# Patient Record
Sex: FEMALE | Race: BLACK OR AFRICAN AMERICAN
[De-identification: names, ages, dates, MRNs, and addresses within clinical notes are randomized per-mention and may not be internally consistent; named-entity substitution may affect disease eponyms.]

---

## 2022-08-26 PROBLEM — Z00.00 ENCOUNTER FOR PREVENTIVE HEALTH EXAMINATION: Status: ACTIVE | Noted: 2022-08-26

## 2022-09-02 ENCOUNTER — APPOINTMENT (OUTPATIENT)
Dept: VASCULAR SURGERY | Facility: CLINIC | Age: 62
End: 2022-09-02

## 2022-09-02 VITALS
DIASTOLIC BLOOD PRESSURE: 75 MMHG | BODY MASS INDEX: 32.2 KG/M2 | SYSTOLIC BLOOD PRESSURE: 146 MMHG | HEIGHT: 60 IN | WEIGHT: 164 LBS | HEART RATE: 56 BPM

## 2022-09-02 PROCEDURE — 93970 EXTREMITY STUDY: CPT

## 2022-09-02 PROCEDURE — 99204 OFFICE O/P NEW MOD 45 MIN: CPT

## 2022-09-02 NOTE — HISTORY OF PRESENT ILLNESS
[FreeTextEntry1] : 62-year-old female with a past medical history of hypertension presents for evaluation of bilateral lower extremity edema.  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  Patient states that when she was working she did wear compression stockings, patient is currently retired at this time and wears compression stockings intermittently.  Patient denies any history of DVT ulcers or wounds.  Patient denies any history of claudications

## 2022-09-02 NOTE — CONSULT LETTER
[Dear  ___] : Dear  [unfilled], [Consult Letter:] : I had the pleasure of evaluating your patient, [unfilled]. [Please see my note below.] : Please see my note below. [Consult Closing:] : Thank you very much for allowing me to participate in the care of this patient.  If you have any questions, please do not hesitate to contact me. [Sincerely,] : Sincerely, [FreeTextEntry3] : Chacho Boudreaux M.D., F.MECCAS., R.P.[email or web address].\par  of Vascular Surgery\par Assistant Professor of Radiology\par Director of Endovascular Program/ [facility]\par [facility]

## 2022-09-02 NOTE — PHYSICAL EXAM
[2+] : left 2+ [Varicose Veins Of Lower Extremities] : bilaterally [Ankle Swelling On The Right] : mild [No Rash or Lesion] : No rash or lesion [Alert] : alert [Calm] : calm [JVD] : no jugular venous distention  [Ankle Swelling (On Exam)] : not present [] : not present [Skin Ulcer] : no ulcer [de-identified] : Appears well [de-identified] : No palpable cords

## 2022-09-02 NOTE — ASSESSMENT
[FreeTextEntry1] : 62-year-old female with a past medical history of hypertension presents for evaluation of bilateral lower extremity edema.\par \par Venous duplex shows no evidence of DVT or insufficiency bilaterally\par \par Suspect pain is likely referred from the lower back patient advised to follow-up with neurology/Ortho. \par  Patient to follow-up as needed no vascular surgical intervention at this time.

## 2025-05-02 ENCOUNTER — TRANSCRIPTION ENCOUNTER (OUTPATIENT)
Age: 65
End: 2025-05-02

## 2025-05-21 ENCOUNTER — TRANSCRIPTION ENCOUNTER (OUTPATIENT)
Age: 65
End: 2025-05-21

## 2025-05-23 ENCOUNTER — TRANSCRIPTION ENCOUNTER (OUTPATIENT)
Age: 65
End: 2025-05-23

## 2025-05-26 ENCOUNTER — APPOINTMENT (OUTPATIENT)
Dept: AFTER HOURS CARE | Facility: EMERGENCY ROOM | Age: 65
End: 2025-05-26
Payer: MEDICARE

## 2025-05-26 ENCOUNTER — TRANSCRIPTION ENCOUNTER (OUTPATIENT)
Age: 65
End: 2025-05-26

## 2025-05-26 PROCEDURE — 99204 OFFICE O/P NEW MOD 45 MIN: CPT | Mod: 2W

## 2025-05-27 ENCOUNTER — TRANSCRIPTION ENCOUNTER (OUTPATIENT)
Age: 65
End: 2025-05-27